# Patient Record
Sex: MALE | Race: WHITE | NOT HISPANIC OR LATINO | URBAN - METROPOLITAN AREA
[De-identification: names, ages, dates, MRNs, and addresses within clinical notes are randomized per-mention and may not be internally consistent; named-entity substitution may affect disease eponyms.]

---

## 2021-11-22 ENCOUNTER — EMERGENCY (EMERGENCY)
Facility: HOSPITAL | Age: 31
LOS: 1 days | Discharge: HOME | End: 2021-11-22
Attending: EMERGENCY MEDICINE
Payer: MEDICARE

## 2021-11-22 VITALS
SYSTOLIC BLOOD PRESSURE: 138 MMHG | OXYGEN SATURATION: 97 % | RESPIRATION RATE: 18 BRPM | WEIGHT: 184.97 LBS | HEART RATE: 115 BPM | TEMPERATURE: 99 F | DIASTOLIC BLOOD PRESSURE: 88 MMHG

## 2021-11-22 VITALS
HEART RATE: 101 BPM | TEMPERATURE: 100 F | RESPIRATION RATE: 18 BRPM | OXYGEN SATURATION: 100 % | DIASTOLIC BLOOD PRESSURE: 84 MMHG | SYSTOLIC BLOOD PRESSURE: 133 MMHG

## 2021-11-22 LAB
FLUAV H1 2009 PAND RNA SPEC QL NAA+PROBE: DETECTED
RAPID RVP RESULT: DETECTED
S PYO AG SPEC QL IA: NEGATIVE — SIGNIFICANT CHANGE UP
SARS-COV-2 RNA SPEC QL NAA+PROBE: SIGNIFICANT CHANGE UP

## 2021-11-22 PROCEDURE — 99284 EMERGENCY DEPT VISIT MOD MDM: CPT | Mod: CS

## 2021-11-22 PROCEDURE — 87081 CULTURE SCREEN ONLY: CPT

## 2021-11-22 PROCEDURE — 0225U NFCT DS DNA&RNA 21 SARSCOV2: CPT

## 2021-11-22 PROCEDURE — 87880 STREP A ASSAY W/OPTIC: CPT

## 2021-11-22 PROCEDURE — 99283 EMERGENCY DEPT VISIT LOW MDM: CPT

## 2021-11-22 NOTE — ED ADULT NURSE NOTE - OBJECTIVE STATEMENT
30 y/o male presents to ED c/o flu like symptoms, requesting covid/rvp swab and strep test. Pt is very well appearing, ambulatory independently w/ no airway or breathing compromise.

## 2021-11-22 NOTE — ED PROVIDER NOTE - PATIENT PORTAL LINK FT
You can access the FollowMyHealth Patient Portal offered by [facility] by registering at the following website: [email or web address]. By joining [facility]’s FollowMyHealth portal, you will also be able to view your health information using other applications (apps) compatible with our system.

## 2021-11-22 NOTE — ED PROVIDER NOTE - OBJECTIVE STATEMENT
31y male pmhx DMI with insulin pump p/w fever. pt reports 101F last night and today, took tylenol with some relief. pt also reports body aches, sore throat, dry cough. no known sick contacts. fully vaccinated for covid. tolerating PO. came for covid test prior to thanksgiving. DM well controlled, last FS this hour 200. Denies CP, SOB, abd pain, NVDC, urinary symptoms, HA, or neck stiffness

## 2021-11-22 NOTE — ED PROVIDER NOTE - NSFOLLOWUPINSTRUCTIONS_ED_ALL_ED_FT
YOU HAVE BEEN TESTED FOR COVID-19. YOU MUST REMAIN ISOLATED AT HOME FOR 10 DAYS MINIMUM UNTIL TOLD OTHERWISE TO PREVENT FURTHER SPREAD OF THIS DISEASE. ANYONE WHO LIVES WITH YOU MUST ALSO SELF ISOLATE.     *IF YOU WISH TO GET YOUR STREP/COVID RESULTS, PLEASE CALL [phone]*    PLEASE READ ATTACHED MATERIAL. AVOID ELDERLY AND IMMUNOCOMPROMISED POPULATION. WASH YOUR HANDS, AVOID TOUCHING YOUR FACE, AND SNEEZE/COUGH INTO THE CROOK OF YOUR ARM.     For fever/body aches:  Take Tylenol 650mg every 6 hrs as needed for pain.  Take motrin 400-600mg every 6-8hours as needed. TAKE WITH FOOD.    Stay well hydrated with water and electrolyte replacement solutions such as Pedialyte or the adult equivalent.     Return to the ED for worsening symptoms, shortness of breath, chest pain, inability to tolerate food/drink, loss of consciousness, or any other serious concerns.      — Wash your hands often with soap and water for at least 15 to 20 seconds or clean your hands with an alcohol-based hand  that contains 60 to 95% alcohol, covering all surfaces of your hands and rubbing them together until they feel dry. Soap and water should be used preferentially if hands are visibly dirty.  — Cover your mouth and nose with a tissue when you cough or sneeze. Throw used tissues in a lined trash can. Immediately wash your hands.  — Avoid touching your eyes, nose, and mouth with your hands.  — Avoid sharing personal household items. You should not share dishes, drinking glasses, cups, eating utensils, towels, or bedding with other people or pets in your home. After using these items, they should be washed thoroughly with soap and water.  — Clean and disinfect all “high-touch” surfaces every day. High touch surfaces include counters, tabletops, doorknobs, light switches, remote controls, bathroom fixtures, toilets, phones, keyboards, tablets, and bedside tables. Also, clean any surfaces that may have blood, stool, or body fluids on them.

## 2021-11-22 NOTE — ED PROVIDER NOTE - CLINICAL SUMMARY MEDICAL DECISION MAKING FREE TEXT BOX
yadiel 31 m insulin dependent dm fs 212 - with 1 day of fever chills myalgia sore throat and non prod cough - no sick contacts - pt ra sats 100 hr repeated 90 - low grade temp took Tylenol prior to arrival - pt covid vaccinated x 3 - lungs cta bl -- youley uri 2/2 virus - co sore throat  no exudate - rvp and strep pt hemodynamically stable  no dest on amb - will dc home w supportive care

## 2021-11-23 NOTE — ED POST DISCHARGE NOTE - ADDITIONAL DOCUMENTATION
Pt called back requesting Tamiflu. Pt within 48 hours symptom onset, type one diabetic. Advised will send rx to preferred pharm. - Regina Jeffrey PA-C